# Patient Record
Sex: FEMALE | Race: OTHER | HISPANIC OR LATINO | Employment: UNEMPLOYED | URBAN - METROPOLITAN AREA
[De-identification: names, ages, dates, MRNs, and addresses within clinical notes are randomized per-mention and may not be internally consistent; named-entity substitution may affect disease eponyms.]

---

## 2022-09-23 ENCOUNTER — HOSPITAL ENCOUNTER (EMERGENCY)
Facility: HOSPITAL | Age: 62
Discharge: HOME | End: 2022-09-23
Attending: EMERGENCY MEDICINE

## 2022-09-23 VITALS
SYSTOLIC BLOOD PRESSURE: 192 MMHG | DIASTOLIC BLOOD PRESSURE: 89 MMHG | HEIGHT: 59 IN | HEART RATE: 74 BPM | WEIGHT: 113 LBS | RESPIRATION RATE: 16 BRPM | OXYGEN SATURATION: 96 % | BODY MASS INDEX: 22.78 KG/M2 | TEMPERATURE: 98 F

## 2022-09-23 DIAGNOSIS — H10.9 CONJUNCTIVITIS OF LEFT EYE, UNSPECIFIED CONJUNCTIVITIS TYPE: Primary | ICD-10-CM

## 2022-09-23 PROCEDURE — 25000003 PHARM REV CODE 250: Performed by: NURSE PRACTITIONER

## 2022-09-23 PROCEDURE — 99283 EMERGENCY DEPT VISIT LOW MDM: CPT

## 2022-09-23 RX ORDER — ERYTHROMYCIN 5 MG/G
OINTMENT OPHTHALMIC
Status: DISCONTINUED | OUTPATIENT
Start: 2022-09-24 | End: 2022-09-24 | Stop reason: HOSPADM

## 2022-09-23 RX ORDER — ERYTHROMYCIN 5 MG/G
OINTMENT OPHTHALMIC
Qty: 1 EACH | Refills: 0 | Status: SHIPPED | OUTPATIENT
Start: 2022-09-23

## 2022-09-23 RX ADMIN — FLUORESCEIN SODIUM AND BENOXINATE HYDROCHLORIDE 1 DROP: 2.5; 4 SOLUTION OPHTHALMIC at 09:09

## 2022-09-24 NOTE — FIRST PROVIDER EVALUATION
Medical screening examination initiated.  I have conducted a focused provider triage encounter, findings are as follows:    Brief history of present illness:  Patient complains of left eye pain was seen at urgent care and started eyedrops but states the pain is not better    There were no vitals filed for this visit.    Pertinent physical exam:  Left lower sclera chemosis    Brief workup plan:  eye evaluation    Preliminary workup initiated; this workup will be continued and followed by the physician or advanced practice provider that is assigned to the patient when roomed.

## 2022-09-24 NOTE — ED PROVIDER NOTES
"Encounter Date: 9/23/2022       History     Chief Complaint   Patient presents with    Eye Problem     Pt c/o left eye pain and drainage that began yesterday.  She went to a clinic today and got eye drops but continues to have problems opening it, and it still hurts.  She states that she can see "a little".     Patient presents to ER for left eye pain, onset yesterday.  Associated symptoms include eye drainage.  Patient states she was evaluated at a local urgent care today and prescribed by drops but states the pain has continued.  She denies fever, chills, generalized body aches, loss of vision.    The history is provided by the patient.   Review of patient's allergies indicates:  No Known Allergies  No past medical history on file.  No past surgical history on file.  No family history on file.     Review of Systems   Constitutional:  Negative for chills, fatigue and fever.   HENT:  Negative for ear pain, rhinorrhea, sinus pain and sore throat.    Eyes:  Positive for pain and discharge.   Respiratory:  Negative for cough and shortness of breath.    Cardiovascular:  Negative for chest pain and palpitations.   Gastrointestinal:  Negative for abdominal pain, nausea and vomiting.   Genitourinary:  Negative for dysuria.   Musculoskeletal:  Negative for back pain and myalgias.   Skin:  Negative for rash.   Neurological:  Negative for weakness and headaches.   All other systems reviewed and are negative.    Physical Exam     Initial Vitals [09/23/22 2142]   BP Pulse Resp Temp SpO2   (!) 192/89 74 16 97.9 °F (36.6 °C) 96 %      MAP       --         Physical Exam    Nursing note and vitals reviewed.  Constitutional: She appears well-developed and well-nourished. No distress.   HENT:   Head: Normocephalic and atraumatic.   Eyes: EOM are normal. Pupils are equal, round, and reactive to light. Right eye exhibits no discharge and no exudate. No foreign body present in the right eye. Left eye exhibits no discharge and no " exudate. No foreign body present in the left eye. Right conjunctiva is not injected. Right conjunctiva has no hemorrhage. Left conjunctiva is injected.   +conjunctival erythema noted to left eye.  Wood's lamp examination reveals no fluorescein uptake.  No foreign body is noted.  No corneal abrasion is noted.   Neck: Neck supple.   Normal range of motion.  Cardiovascular:  Normal rate, regular rhythm and intact distal pulses.           Pulmonary/Chest: Breath sounds normal. No respiratory distress.   Musculoskeletal:         General: Normal range of motion.      Cervical back: Normal range of motion and neck supple.     Neurological: She is alert and oriented to person, place, and time. She has normal strength. GCS score is 15. GCS eye subscore is 4. GCS verbal subscore is 5. GCS motor subscore is 6.   Skin: Skin is warm and dry. Capillary refill takes less than 2 seconds.       ED Course   Procedures  Labs Reviewed - No data to display       Imaging Results    None          Medications   erythromycin 5 mg/gram (0.5 %) ophthalmic ointment (has no administration in time range)   fluorescein-benoxinate 0.25-0.4% ophthalmic solution 1 drop (1 drop Left Eye Given 9/23/22 5539)                  I discussed with patient and family/caretaker that evaluation in the ED does not suggest any emergent or life threatening medical conditions requiring immediate intervention beyond what was provided in the ED, and I believe patient is safe for discharge. Regardless, an unremarkable evaluation in the ED does not preclude the development or presence of a serious of life threatening condition. As such, patient was instructed to return immediately for any worsening or change in current symptoms.              Clinical Impression:   Final diagnoses:  [H10.9] Conjunctivitis of left eye, unspecified conjunctivitis type (Primary)      ED Disposition Condition    Discharge Stable          ED Prescriptions       Medication Sig Dispense Start  Date End Date Auth. Provider    erythromycin (ROMYCIN) ophthalmic ointment Place a 1/2 inch ribbon of ointment into the lower eyelid 4 times daily for 7 days. 1 each 9/23/2022 -- Rishi Crespo NP          Follow-up Information       Follow up With Specialties Details Why Contact Info Additional Information    [facility] Ophthalmology [facility] Ophthalmology In 1 day  [address] [address] [phone] Please park on the [address] side and use the Clnic entrance. Check in on the 3rd floor or use any kiosk for self check-in.    [facility]  In 1 day  [address] [address] [phone]       [facility] Emergency Dept. Emergency Medicine  As needed, If symptoms worsen [address]  [facility] [address] [phone]              Rishi Crespo NP  09/24/22 0223

## 2022-10-01 ENCOUNTER — HOSPITAL ENCOUNTER (EMERGENCY)
Facility: HOSPITAL | Age: 62
Discharge: ANOTHER HOSPITAL | End: 2022-10-01
Attending: EMERGENCY MEDICINE

## 2022-10-01 VITALS
SYSTOLIC BLOOD PRESSURE: 140 MMHG | TEMPERATURE: 98 F | OXYGEN SATURATION: 96 % | RESPIRATION RATE: 16 BRPM | DIASTOLIC BLOOD PRESSURE: 65 MMHG | HEART RATE: 86 BPM | WEIGHT: 112 LBS | BODY MASS INDEX: 22.62 KG/M2

## 2022-10-01 DIAGNOSIS — H57.12 ACUTE LEFT EYE PAIN: Primary | ICD-10-CM

## 2022-10-01 DIAGNOSIS — R07.9 CHEST PAIN: ICD-10-CM

## 2022-10-01 DIAGNOSIS — I10 HYPERTENSION, UNSPECIFIED TYPE: ICD-10-CM

## 2022-10-01 DIAGNOSIS — H54.62 VISION LOSS OF LEFT EYE: ICD-10-CM

## 2022-10-01 LAB
ALBUMIN SERPL BCP-MCNC: 3.1 G/DL (ref 3.5–5.2)
ALP SERPL-CCNC: 105 U/L (ref 55–135)
ALT SERPL W/O P-5'-P-CCNC: 13 U/L (ref 10–44)
ANION GAP SERPL CALC-SCNC: 11 MMOL/L (ref 8–16)
AST SERPL-CCNC: 22 U/L (ref 10–40)
BASOPHILS # BLD AUTO: 0.06 K/UL (ref 0–0.2)
BASOPHILS NFR BLD: 1 % (ref 0–1.9)
BILIRUB SERPL-MCNC: 0.5 MG/DL (ref 0.1–1)
BNP SERPL-MCNC: 325 PG/ML (ref 0–99)
BUN SERPL-MCNC: 42 MG/DL (ref 8–23)
CALCIUM SERPL-MCNC: 8.8 MG/DL (ref 8.7–10.5)
CHLORIDE SERPL-SCNC: 97 MMOL/L (ref 95–110)
CO2 SERPL-SCNC: 24 MMOL/L (ref 23–29)
CREAT SERPL-MCNC: 2.7 MG/DL (ref 0.5–1.4)
DIFFERENTIAL METHOD: ABNORMAL
EOSINOPHIL # BLD AUTO: 0.3 K/UL (ref 0–0.5)
EOSINOPHIL NFR BLD: 4.4 % (ref 0–8)
ERYTHROCYTE [DISTWIDTH] IN BLOOD BY AUTOMATED COUNT: 13.2 % (ref 11.5–14.5)
EST. GFR  (NO RACE VARIABLE): 19 ML/MIN/1.73 M^2
GLUCOSE SERPL-MCNC: 193 MG/DL (ref 70–110)
HCT VFR BLD AUTO: 27.6 % (ref 37–48.5)
HGB BLD-MCNC: 9.2 G/DL (ref 12–16)
IMM GRANULOCYTES # BLD AUTO: 0.02 K/UL (ref 0–0.04)
IMM GRANULOCYTES NFR BLD AUTO: 0.3 % (ref 0–0.5)
LYMPHOCYTES # BLD AUTO: 1.3 K/UL (ref 1–4.8)
LYMPHOCYTES NFR BLD: 21.7 % (ref 18–48)
MCH RBC QN AUTO: 27.8 PG (ref 27–31)
MCHC RBC AUTO-ENTMCNC: 33.3 G/DL (ref 32–36)
MCV RBC AUTO: 83 FL (ref 82–98)
MONOCYTES # BLD AUTO: 0.4 K/UL (ref 0.3–1)
MONOCYTES NFR BLD: 5.9 % (ref 4–15)
NEUTROPHILS # BLD AUTO: 4 K/UL (ref 1.8–7.7)
NEUTROPHILS NFR BLD: 66.7 % (ref 38–73)
NRBC BLD-RTO: 0 /100 WBC
PLATELET # BLD AUTO: 269 K/UL (ref 150–450)
PMV BLD AUTO: 9.9 FL (ref 9.2–12.9)
POTASSIUM SERPL-SCNC: 4.7 MMOL/L (ref 3.5–5.1)
PROT SERPL-MCNC: 6.9 G/DL (ref 6–8.4)
RBC # BLD AUTO: 3.31 M/UL (ref 4–5.4)
SODIUM SERPL-SCNC: 132 MMOL/L (ref 136–145)
TROPONIN I SERPL DL<=0.01 NG/ML-MCNC: 0.01 NG/ML (ref 0–0.03)
WBC # BLD AUTO: 5.95 K/UL (ref 3.9–12.7)

## 2022-10-01 PROCEDURE — 96375 TX/PRO/DX INJ NEW DRUG ADDON: CPT

## 2022-10-01 PROCEDURE — 96365 THER/PROPH/DIAG IV INF INIT: CPT

## 2022-10-01 PROCEDURE — 25000003 PHARM REV CODE 250: Performed by: EMERGENCY MEDICINE

## 2022-10-01 PROCEDURE — 96376 TX/PRO/DX INJ SAME DRUG ADON: CPT

## 2022-10-01 PROCEDURE — 84484 ASSAY OF TROPONIN QUANT: CPT | Performed by: NURSE PRACTITIONER

## 2022-10-01 PROCEDURE — 99285 EMERGENCY DEPT VISIT HI MDM: CPT | Mod: 25

## 2022-10-01 PROCEDURE — 63600175 PHARM REV CODE 636 W HCPCS: Performed by: EMERGENCY MEDICINE

## 2022-10-01 PROCEDURE — 85025 COMPLETE CBC W/AUTO DIFF WBC: CPT | Performed by: NURSE PRACTITIONER

## 2022-10-01 PROCEDURE — 25000003 PHARM REV CODE 250: Performed by: NURSE PRACTITIONER

## 2022-10-01 PROCEDURE — 63600175 PHARM REV CODE 636 W HCPCS: Performed by: NURSE PRACTITIONER

## 2022-10-01 PROCEDURE — 83880 ASSAY OF NATRIURETIC PEPTIDE: CPT | Performed by: NURSE PRACTITIONER

## 2022-10-01 PROCEDURE — 80053 COMPREHEN METABOLIC PANEL: CPT | Performed by: NURSE PRACTITIONER

## 2022-10-01 RX ORDER — MANNITOL 250 MG/ML
25 INJECTION, SOLUTION INTRAVENOUS
Status: DISCONTINUED | OUTPATIENT
Start: 2022-10-01 | End: 2022-10-01 | Stop reason: HOSPADM

## 2022-10-01 RX ORDER — MORPHINE SULFATE 4 MG/ML
2 INJECTION, SOLUTION INTRAMUSCULAR; INTRAVENOUS
Status: COMPLETED | OUTPATIENT
Start: 2022-10-01 | End: 2022-10-01

## 2022-10-01 RX ORDER — ONDANSETRON 2 MG/ML
4 INJECTION INTRAMUSCULAR; INTRAVENOUS ONCE
Status: COMPLETED | OUTPATIENT
Start: 2022-10-01 | End: 2022-10-01

## 2022-10-01 RX ORDER — PILOCARPINE HYDROCHLORIDE 20 MG/ML
1 SOLUTION/ DROPS OPHTHALMIC 4 TIMES DAILY
Status: DISCONTINUED | OUTPATIENT
Start: 2022-10-01 | End: 2022-10-01 | Stop reason: HOSPADM

## 2022-10-01 RX ORDER — TIMOLOL MALEATE 5 MG/ML
1 SOLUTION/ DROPS OPHTHALMIC 2 TIMES DAILY
Status: DISCONTINUED | OUTPATIENT
Start: 2022-10-01 | End: 2022-10-01 | Stop reason: HOSPADM

## 2022-10-01 RX ORDER — DORZOLAMIDE HCL 20 MG/ML
1 SOLUTION/ DROPS OPHTHALMIC ONCE
Status: DISCONTINUED | OUTPATIENT
Start: 2022-10-01 | End: 2022-10-01

## 2022-10-01 RX ORDER — BRIMONIDINE TARTRATE 1.5 MG/ML
1 SOLUTION/ DROPS OPHTHALMIC EVERY 8 HOURS
Status: DISCONTINUED | OUTPATIENT
Start: 2022-10-01 | End: 2022-10-01 | Stop reason: HOSPADM

## 2022-10-01 RX ORDER — HYDRALAZINE HYDROCHLORIDE 20 MG/ML
10 INJECTION INTRAMUSCULAR; INTRAVENOUS
Status: COMPLETED | OUTPATIENT
Start: 2022-10-01 | End: 2022-10-01

## 2022-10-01 RX ORDER — ONDANSETRON 2 MG/ML
4 INJECTION INTRAMUSCULAR; INTRAVENOUS
Status: COMPLETED | OUTPATIENT
Start: 2022-10-01 | End: 2022-10-01

## 2022-10-01 RX ORDER — ACETAZOLAMIDE 250 MG/1
500 TABLET ORAL ONCE
Status: DISCONTINUED | OUTPATIENT
Start: 2022-10-01 | End: 2022-10-01

## 2022-10-01 RX ORDER — SODIUM CHLORIDE 9 MG/ML
1000 INJECTION, SOLUTION INTRAVENOUS CONTINUOUS
Status: DISCONTINUED | OUTPATIENT
Start: 2022-10-01 | End: 2022-10-01 | Stop reason: HOSPADM

## 2022-10-01 RX ADMIN — ONDANSETRON 4 MG: 2 INJECTION INTRAMUSCULAR; INTRAVENOUS at 03:10

## 2022-10-01 RX ADMIN — HYDRALAZINE HYDROCHLORIDE 10 MG: 20 INJECTION, SOLUTION INTRAMUSCULAR; INTRAVENOUS at 05:10

## 2022-10-01 RX ADMIN — SODIUM CHLORIDE 1000 ML: 0.9 INJECTION, SOLUTION INTRAVENOUS at 06:10

## 2022-10-01 RX ADMIN — MORPHINE SULFATE 2 MG: 4 INJECTION INTRAVENOUS at 06:10

## 2022-10-01 RX ADMIN — FLUORESCEIN SODIUM AND BENOXINATE HYDROCHLORIDE 1 DROP: 2.5; 4 SOLUTION OPHTHALMIC at 06:10

## 2022-10-01 RX ADMIN — PILOCARPINE HYDROCHLORIDE 1 DROP: 20 SOLUTION/ DROPS OPHTHALMIC at 06:10

## 2022-10-01 RX ADMIN — ACETAZOLAMIDE 500 MG: 500 INJECTION, POWDER, LYOPHILIZED, FOR SOLUTION INTRAVENOUS at 06:10

## 2022-10-01 RX ADMIN — MORPHINE SULFATE 2 MG: 4 INJECTION INTRAVENOUS at 03:10

## 2022-10-01 RX ADMIN — TIMOLOL MALEATE 1 DROP: 5 SOLUTION/ DROPS OPHTHALMIC at 06:10

## 2022-10-01 RX ADMIN — ONDANSETRON 4 MG: 2 INJECTION INTRAMUSCULAR; INTRAVENOUS at 06:10

## 2022-10-01 RX ADMIN — BRIMONIDINE TARTRATE 1 DROP: 1.5 SOLUTION OPHTHALMIC at 06:10

## 2022-10-01 NOTE — FIRST PROVIDER EVALUATION
Medical screening examination initiated.  I have conducted a focused provider triage encounter, findings are as follows:    Brief history of present illness:  left sided eye pain without relief from meds. Also reports chest pain and palpitations     Vitals:    10/01/22 1305   BP: (!) 184/82   BP Location: Left arm   Patient Position: Sitting   Pulse: 77   Resp: 20   Temp: 97.6 °F (36.4 °C)   TempSrc: Oral   SpO2: 99%       Pertinent physical exam:  nad    Brief workup plan:  labs, meds, imaging     Preliminary workup initiated; this workup will be continued and followed by the physician or advanced practice provider that is assigned to the patient when roomed.

## 2022-10-01 NOTE — ED PROVIDER NOTES
SCRIBE #1 NOTE: I, Idalia Clinton, am scribing for, and in the presence of, Delmi Titus DO. I have scribed the entire note.       History     Chief Complaint   Patient presents with    Eye Pain     L eye pain. Seen here for same issue on 9/23. Dx w/ pink eye, has been using abx ointment w/ no relief.     Review of patient's allergies indicates:  No Known Allergies      History of Present Illness     HPI    10/1/2022, 5:36 PM  History obtained from the patient through [employer] [facility]      History of Present Illness: Roxana Poole is a 62 y.o. female patient who presents to the Emergency Department for evaluation of left eye pain which onset 2 months PTA and worsened a few hours PTA. Symptoms are constant and moderate in severity.  Prior Tx includes 8 days PTA pt was evaluated for eye pain, and she was diagnosed with an eye infection and given antibiotic ointment to put on 4 times a day. Pt notes she has had pain in her left eye for 2 months. Pt states that slowly over time, since two months ago, her vision has worsened; 3 weeks ago she could see a little, and 2 weeks ago she could not see anything. Pt reports she still cannot see anything out of the left eye except for light. Pt notes that she cannot open the eye and light bothers it. No mitigating or exacerbating factors reported. Associated sxs include loss of vision in left eye, watering of left eye, and rhinorrhea of left nostril. Patient denies any n/v, fever, one-sided numbness, eye itching, chills, and all other sxs at this time. No further complaints or concerns at this time. No Optometry on call.       From old chart:  Noted on 8/10/2022  Vision decreased 08/10/2022   Last Assessment & Plan:     Formatting of this note is different from the original.  History & Physical   Patient has nearly full vision loss in left eye and blurred vision in her right eye, which she has noticed in the past 2 weeks. There is concern that this may be secondary to  diabetic retinopathy or hypertensive retinopathy.  - Continue to monitor, consider ophthalmology evaluation outpatient.  Discharge Summary        Arrival mode: Personal vehicle    PCP: Primary Doctor No        Past Medical History:  Past Medical History:   Diagnosis Date    Anemia of chronic renal failure, stage 4 (severe)     Combined congestive systolic and diastolic heart failure     Decreased peripheral vision, left     Hyponatremia     Neurogenic bladder        Past Surgical History:  Past Surgical History:   Procedure Laterality Date    COLONOSCOPY           Family History:  No family history on file.    Social History:  Social History     Tobacco Use    Smoking status: Never    Smokeless tobacco: Not on file   Substance and Sexual Activity    Alcohol use: Never    Drug use: Never    Sexual activity: Not on file        Review of Systems     Review of Systems   Constitutional:  Negative for chills and fever.   HENT:  Positive for rhinorrhea (left nostril). Negative for sore throat.    Eyes:  Positive for pain (left). Negative for itching.        (+) left eye watering  (+) loss of vision in left eye   Respiratory:  Positive for shortness of breath.    Cardiovascular:  Positive for chest pain.   Gastrointestinal:  Negative for nausea and vomiting.   Genitourinary:  Negative for dysuria.   Musculoskeletal:  Negative for back pain.   Skin:  Negative for rash.   Neurological:  Negative for weakness and numbness (one-sided).   Hematological:  Does not bruise/bleed easily.   All other systems reviewed and are negative.     Physical Exam     Initial Vitals [10/01/22 1305]   BP Pulse Resp Temp SpO2   (!) 184/82 77 20 97.6 °F (36.4 °C) 99 %      MAP       --          Physical Exam  Nursing Notes and Vital Signs Reviewed.  Constitutional: Patient is in no acute distress. Well-developed and well-nourished.  Head: Atraumatic. Normocephalic.  Eyes: PERRL. EOM intact. Conjunctivae are not pale. No scleral icterus.   Left eye  cloudy. Left eye dilated. Pupils mid point. Left eye conjunctivae are red.  Left pupil is minimally reactive.  No relief with application of topical anesthetic.  Patient can only see light cautioned unable to count fingers with left eye.  Eyelids everted no foreign body.  Wood's lamp: No fluorescein uptake.  Negative Alton sign.  Handheld tonometer:  Left eye pressure, 22, 55, 58  ENT: Mucous membranes are moist. Oropharynx is clear and symmetric.    Neck: Supple. Full ROM. No lymphadenopathy.  Cardiovascular: Regular rate. Regular rhythm. No murmurs, rubs, or gallops. Distal pulses are 2+ and symmetric.  Pulmonary/Chest: No respiratory distress. Clear to auscultation bilaterally. No wheezing or rales.  Abdominal: Soft and non-distended.  There is no tenderness.  No rebound, guarding, or rigidity. Good bowel sounds.  Genitourinary: No CVA tenderness  Musculoskeletal: Moves all extremities. No obvious deformities. No edema. No calf tenderness.  Skin: Warm and dry.  Neurological:  Alert, awake, and appropriate.  Normal speech.  No acute focal neurological deficits are appreciated.  GCS is 15.  Cranial nerves 2-12 intact.  No focal deficits  Psychiatric: Normal affect. Good eye contact. Appropriate in content.     ED Course   Procedures  ED Vital Signs:  Vitals:    10/01/22 1305 10/01/22 1513 10/01/22 1516 10/01/22 1537   BP: (!) 184/82   (!) 199/88   Pulse: 77   72   Resp: 20 20  18   Temp: 97.6 °F (36.4 °C)      TempSrc: Oral      SpO2: 99%   97%   Weight:   50.8 kg (112 lb)     10/01/22 1735 10/01/22 1746 10/01/22 1802 10/01/22 1845   BP: (!) 216/102 (!) 192/83 (!) 140/65    Pulse:  83 86    Resp:  14 15 16   Temp:       TempSrc:       SpO2:  98% 96%    Weight:           Abnormal Lab Results:  Labs Reviewed   CBC W/ AUTO DIFFERENTIAL - Abnormal; Notable for the following components:       Result Value    RBC 3.31 (*)     Hemoglobin 9.2 (*)     Hematocrit 27.6 (*)     All other components within normal limits    COMPREHENSIVE METABOLIC PANEL - Abnormal; Notable for the following components:    Sodium 132 (*)     Glucose 193 (*)     BUN 42 (*)     Creatinine 2.7 (*)     Albumin 3.1 (*)     eGFR 19 (*)     All other components within normal limits   B-TYPE NATRIURETIC PEPTIDE - Abnormal; Notable for the following components:     (*)     All other components within normal limits   TROPONIN I        All Lab Results:  Results for orders placed or performed during the hospital encounter of 10/01/22   CBC auto differential   Result Value Ref Range    WBC 5.95 3.90 - 12.70 K/uL    RBC 3.31 (L) 4.00 - 5.40 M/uL    Hemoglobin 9.2 (L) 12.0 - 16.0 g/dL    Hematocrit 27.6 (L) 37.0 - 48.5 %    MCV 83 82 - 98 fL    MCH 27.8 27.0 - 31.0 pg    MCHC 33.3 32.0 - 36.0 g/dL    RDW 13.2 11.5 - 14.5 %    Platelets 269 150 - 450 K/uL    MPV 9.9 9.2 - 12.9 fL    Immature Granulocytes 0.3 0.0 - 0.5 %    Gran # (ANC) 4.0 1.8 - 7.7 K/uL    Immature Grans (Abs) 0.02 0.00 - 0.04 K/uL    Lymph # 1.3 1.0 - 4.8 K/uL    Mono # 0.4 0.3 - 1.0 K/uL    Eos # 0.3 0.0 - 0.5 K/uL    Baso # 0.06 0.00 - 0.20 K/uL    nRBC 0 0 /100 WBC    Gran % 66.7 38.0 - 73.0 %    Lymph % 21.7 18.0 - 48.0 %    Mono % 5.9 4.0 - 15.0 %    Eosinophil % 4.4 0.0 - 8.0 %    Basophil % 1.0 0.0 - 1.9 %    Differential Method Automated    Comprehensive metabolic panel   Result Value Ref Range    Sodium 132 (L) 136 - 145 mmol/L    Potassium 4.7 3.5 - 5.1 mmol/L    Chloride 97 95 - 110 mmol/L    CO2 24 23 - 29 mmol/L    Glucose 193 (H) 70 - 110 mg/dL    BUN 42 (H) 8 - 23 mg/dL    Creatinine 2.7 (H) 0.5 - 1.4 mg/dL    Calcium 8.8 8.7 - 10.5 mg/dL    Total Protein 6.9 6.0 - 8.4 g/dL    Albumin 3.1 (L) 3.5 - 5.2 g/dL    Total Bilirubin 0.5 0.1 - 1.0 mg/dL    Alkaline Phosphatase 105 55 - 135 U/L    AST 22 10 - 40 U/L    ALT 13 10 - 44 U/L    Anion Gap 11 8 - 16 mmol/L    eGFR 19 (A) >60 mL/min/1.73 m^2   Troponin I #1   Result Value Ref Range    Troponin I 0.014 0.000 - 0.026 ng/mL    BNP   Result Value Ref Range     (H) 0 - 99 pg/mL         Imaging Results:  Imaging Results              X-Ray Chest AP Portable (Final result)  Result time 10/01/22 16:32:46      Final result by Rae Hathaway MD (10/01/22 16:32:46)                   Impression:      Mild interstitial perihilar prominence.  No focal consolidation pleural effusion pneumothorax.  Element of pulmonary edema not excluded.      Electronically signed by: Rivas Mcbride  Date:    10/01/2022  Time:    16:32               Narrative:    EXAMINATION:  XR CHEST AP PORTABLE    CLINICAL HISTORY:  Chest pain, unspecified    TECHNIQUE:  Single frontal view of the chest was performed.    COMPARISON:  None    FINDINGS:  Minimal interstitial prominence.  Question element of pulmonary edema.The lungs are clear, with normal appearance of pulmonary vasculature and no pleural effusion or pneumothorax.    The cardiac silhouette is prominent.  The hilar and mediastinal contours are unremarkable.    Bones are intact.                                       The EKG was ordered, reviewed, and independently interpreted by the ED provider.  EKG: Rate is 73 beats per minute.  Sinus rhythm.  Normal axis.  No ST segment elevation.  Nonspecific changes           The Emergency Provider reviewed the vital signs and test results, which are outlined above.     ED Discussion       ED Course as of 10/01/22 1905   Sat Oct 01, 2022   1618 Creatinine Level 0.57 - 1.25 mg/dL 2.51 High     Blood Urea Nitrogen Level 5 - 25 mg/dL 54 High     Sodium Level 136 - 145 mmol/L 126 Low     Potassium Level 3.5 - 5.1 mmol/L 4.5    Chloride Level 100 - 109 mmol/L 86 Low     CO2 Level 22 - 33 mmol/L 30    Glucose Level 70 - 100 mg/dL 107 High     Calcium Level 8.8 - 10.6 mg/dL 8.2 Low     Protein Total 6.0 - 8.3 g/dL 5.4 Low     Albumin Level 3.5 - 5.0 g/dl 2.7 Low     Bilirubin Total 0.2 - 1.2 mg/dL 0.4    Alkaline Phosphatase Level 40 - 150 U/L 114    SGOT (AST) 10 - 58 U/L 15     SGPT (ALT) 5 - 50 U/L 9    Anion Gap 8 - 16 mmol/L 10    EGFR M/min/1.73mSq 21      Creatinine stable   [LB]      ED Course User Index  [LB] Delmi Titus DO       NOTE:   [facility]  DOES NOT HAVE OPHTHALMOLOGY SERVICES.  Patient requesting closest facility.      6:29 PM      All historical, clinical, radiographic, and laboratory findings were reviewed with the patient/family in detail.  I discussed the indications and treatment need: (Ophthalmology) .    Patient/Family requests transfer to: [facility]    Patient/Family understands that [facility] does NOT provide (Ophthalmology) services.     Patient/family verbalized understanding.   All remaining questions and concerns were addressed at that time and the patient/family agrees to proceed accordingly.  Similarly all pertinent details of the encounter were discussed with  Brandi Tang at  [facility]. Dr. Davis at [facility]  agrees to accept the patient in transfer based on the needs/patient preferences outlined above.  Patient will be transferred by [facility] , secondary to a need for ongoing Cardiac Monitoring, Pain Control, and IV drip  en route.  Risks: of transfer:    loss of vitals signs, permanent neurologic damage, MVC, loss of vision resulting in death, blindness, or loss of neurologic function.  Benefits of transfer: Ophthalmology.  Patient and family agree and verbalize understanding.     Idalia Clinton FACEP            ED Medication(s):  Medications   timolol maleate 0.5% ophthalmic solution 1 drop (1 drop Left Eye Given 10/1/22 1819)   pilocarpine HCL 2% ophthalmic solution 1 drop (1 drop Left Eye Given 10/1/22 1820)   brimonidine 0.15 % OPTH DROP ophthalmic solution 1 drop (1 drop Left Eye Given 10/1/22 1819)   mannitol 25% injection 25 g (has no administration in time range)   0.9%  NaCl infusion (1,000 mLs Intravenous New Bag 10/1/22 1843)   fluorescein-benoxinate  0.25-0.4% ophthalmic solution 1 drop (1 drop Left Eye Given 10/1/22 1828)   morphine injection 2 mg (2 mg Intravenous Given 10/1/22 1513)   ondansetron injection 4 mg (4 mg Intravenous Given 10/1/22 1517)   hydrALAZINE injection 10 mg (10 mg Intravenous Given 10/1/22 1735)   acetaZOLAMIDE (DIAMOX) 500 mg in dextrose 5 % 50 mL (0 mg Intravenous Stopped 10/1/22 1900)   morphine injection 2 mg (2 mg Intravenous Given 10/1/22 1845)   ondansetron injection 4 mg (4 mg Intravenous Given 10/1/22 1845)       New Prescriptions    No medications on file               Scribe Attestation:   Scribe #1: I performed the above scribed service and the documentation accurately describes the services I performed. I attest to the accuracy of the note.     Attending:   Physician Attestation Statement for Scribe #1: I, Delmi Titus DO, personally performed the services described in this documentation, as scribed by Idalia Clinton, in my presence, and it is both accurate and complete.           Clinical Impression       ICD-10-CM ICD-9-CM   1. Acute left eye pain  H57.12 379.91   2. Chest pain  R07.9 786.50   3. Vision loss of left eye (gradual in onset for last 2 months, 2 weeks ago worsening vision)  H54.62 369.8   4. Hypertension, unspecified type  I10 401.9       Disposition:   Disposition: Transferred  Condition: Fair       Delmi Titus DO  10/02/22 0606

## 2022-11-02 DIAGNOSIS — H40.9 GLAUCOMA, UNSPECIFIED GLAUCOMA TYPE, UNSPECIFIED LATERALITY: Primary | ICD-10-CM

## 2022-11-10 ENCOUNTER — OFFICE VISIT (OUTPATIENT)
Dept: OPHTHALMOLOGY | Facility: CLINIC | Age: 62
End: 2022-11-10

## 2022-11-10 VITALS — BODY MASS INDEX: 22.58 KG/M2 | WEIGHT: 112 LBS | HEIGHT: 59 IN

## 2022-11-10 DIAGNOSIS — E11.3593 PROLIFERATIVE DIABETIC RETINOPATHY OF BOTH EYES ASSOCIATED WITH TYPE 2 DIABETES MELLITUS, UNSPECIFIED PROLIFERATIVE RETINOPATHY TYPE: ICD-10-CM

## 2022-11-10 DIAGNOSIS — H40.52X4 NEOVASCULAR GLAUCOMA OF LEFT EYE, INDETERMINATE STAGE: Primary | ICD-10-CM

## 2022-11-10 PROCEDURE — 99215 OFFICE O/P EST HI 40 MIN: CPT | Mod: PBBFAC,PO

## 2022-11-10 RX ORDER — PILOCARPINE HYDROCHLORIDE 10 MG/ML
1 SOLUTION/ DROPS OPHTHALMIC
COMMUNITY
Start: 2022-10-01 | End: 2023-03-30

## 2022-11-10 RX ORDER — FUROSEMIDE 80 MG/1
1 TABLET ORAL DAILY
COMMUNITY
Start: 2022-09-19 | End: 2023-09-19

## 2022-11-10 RX ORDER — DORZOLAMIDE HCL 20 MG/ML
1 SOLUTION/ DROPS OPHTHALMIC 3 TIMES DAILY
COMMUNITY
Start: 2022-10-03

## 2022-11-10 RX ORDER — PREDNISOLONE ACETATE 10 MG/ML
SUSPENSION/ DROPS OPHTHALMIC
COMMUNITY

## 2022-11-10 RX ORDER — LATANOPROST 50 UG/ML
1 SOLUTION/ DROPS OPHTHALMIC NIGHTLY
COMMUNITY
Start: 2022-10-03

## 2022-11-10 RX ORDER — HYDROCHLOROTHIAZIDE 12.5 MG/1
CAPSULE ORAL
COMMUNITY

## 2022-11-10 RX ORDER — ONDANSETRON 4 MG/1
TABLET, ORALLY DISINTEGRATING ORAL
COMMUNITY

## 2022-11-10 RX ORDER — TAMSULOSIN HYDROCHLORIDE 0.4 MG/1
1 CAPSULE ORAL NIGHTLY
COMMUNITY
Start: 2022-10-14

## 2022-11-10 RX ORDER — HYDRALAZINE HYDROCHLORIDE 25 MG/1
1 TABLET, FILM COATED ORAL 3 TIMES DAILY
COMMUNITY
Start: 2022-09-19 | End: 2023-09-19

## 2022-11-10 RX ORDER — TIMOLOL MALEATE 5 MG/ML
SOLUTION/ DROPS OPHTHALMIC
COMMUNITY
Start: 2022-10-03

## 2022-11-10 RX ORDER — BETHANECHOL CHLORIDE 50 MG/1
50 TABLET ORAL
COMMUNITY
Start: 2022-10-14 | End: 2023-04-12

## 2022-11-10 RX ORDER — PIOGLITAZONEHYDROCHLORIDE 15 MG/1
TABLET ORAL
COMMUNITY

## 2022-11-10 RX ORDER — OMEPRAZOLE 20 MG/1
20 CAPSULE, DELAYED RELEASE ORAL DAILY
COMMUNITY
Start: 2022-10-24

## 2022-11-10 RX ORDER — LOSARTAN POTASSIUM 100 MG/1
TABLET ORAL
COMMUNITY

## 2022-11-10 RX ORDER — ACETAZOLAMIDE 500 MG/1
500 CAPSULE, EXTENDED RELEASE ORAL 2 TIMES DAILY
COMMUNITY

## 2022-11-10 RX ORDER — METFORMIN HYDROCHLORIDE 1000 MG/1
1000 TABLET ORAL 2 TIMES DAILY
COMMUNITY
Start: 2022-07-01

## 2022-11-10 RX ORDER — ATORVASTATIN CALCIUM 40 MG/1
1 TABLET, FILM COATED ORAL NIGHTLY
COMMUNITY
Start: 2022-09-19

## 2022-11-10 RX ORDER — GLIPIZIDE 5 MG/1
TABLET ORAL
COMMUNITY

## 2022-11-10 RX ORDER — PROPARACAINE HYDROCHLORIDE 5 MG/ML
1 SOLUTION/ DROPS OPHTHALMIC
Status: CANCELLED | OUTPATIENT
Start: 2022-11-10

## 2022-11-10 RX ORDER — GABAPENTIN 100 MG/1
100 CAPSULE ORAL 3 TIMES DAILY
COMMUNITY
Start: 2022-10-03

## 2022-11-10 RX ORDER — CARVEDILOL 12.5 MG/1
12.5 TABLET ORAL 2 TIMES DAILY
COMMUNITY
Start: 2022-10-11

## 2022-11-10 RX ORDER — ISOSORBIDE DINITRATE 20 MG/1
20 TABLET ORAL
COMMUNITY
Start: 2022-09-19 | End: 2023-03-18

## 2022-11-10 RX ORDER — SODIUM CHLORIDE 0.9 % (FLUSH) 0.9 %
10 SYRINGE (ML) INJECTION
Status: SHIPPED | OUTPATIENT
Start: 2022-11-10

## 2022-11-10 NOTE — H&P
Pre-Operative History & Physical  Ophthalmology      SUBJECTIVE:     History of Present Illness:  Patient is a 62 y.o. 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    MEDICATIONS: (Not in a hospital admission)      ALLERGIES: Review of patient's allergies indicates:  No Known Allergies    PAST MEDICAL HISTORY:   Past Medical History:   Diagnosis Date    Anemia of chronic renal failure, stage 4 (severe)     Combined congestive systolic and diastolic heart failure     Decreased peripheral vision, left     Hyponatremia     Neurogenic bladder      PAST SURGICAL HISTORY:   Past Surgical History:   Procedure Laterality Date    COLONOSCOPY       PAST FAMILY HISTORY:   Family History   Problem Relation Age of Onset    Diabetes Sister      SOCIAL HISTORY:   Social History     Tobacco Use    Smoking status: Never     Passive exposure: Never   Substance Use Topics    Alcohol use: Never    Drug use: Never        MENTAL STATUS: Alert    REVIEW OF SYSTEMS: Negative    OBJECTIVE:     Vital Signs (Most Recent)       Physical Exam:  General: NAD  HEENT: AT/NC, left eye IOP 36  Lungs: Adequate respirations  Heart: + pulses  Abdomen: Soft    ASSESSMENT/PLAN:     Patient is a 62 y.o. 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    - Plan for tube shunt surgery (Ahmed valve) left eye (OS).   - Allergies reviewed: Review of patient's allergies indicates:  No Known Allergies  - Has not taken blood thinners (includes ASA, NSAIDS, BC Powder, Goody's Powder, Excedrine, Eliquis, Xarelto, Pradaxa, etc.) for over 5 days.   - Risks/benefits/alternatives of the procedure including, but not limited to scarring, bleeding, infection, loss or decreased vision, and/or need for possible repeat surgery discussed with the patient and family.  - Informed consent obtained prior to surgery and the patient/family voiced good understanding.    Lama Robin MD  [facility]        patient left area and returned to [facility] without undergoing recommended surgery

## 2022-11-10 NOTE — PROGRESS NOTES
Assessment /Plan     For exam results, see Encounter Report.    Neovascular glaucoma of left eye, indeterminate stage    Proliferative diabetic retinopathy of both eyes associated with type 2 diabetes mellitus, unspecified proliferative retinopathy type    Neovascular glaucoma, left eye  - Referred from  for surgery evaluation for elevated IOP (44) despite maximum therapy   - On timolol BID, brimonidine TID, dorzolamide TID, latanoprost QHS, and PF QID, in the left eye  - Started Diamox 500 mg PO BID 10/1/22, patient has CKD, referral to nephrology placed, but patient seems to be tolerating it well  - NVI and NVA regressed in both eyes, however, heavy PAS OS likely sealing off much of angle  - Plan for urgent tube shunt surgery with Ahmed valve today     Type II DM with PDR, both eyes  - Following with Dr. Broderick in   - Received intravitreal Avastin in the left eye around 3-4 weeks ago

## 2022-11-29 NOTE — PROGRESS NOTES
I have reviewed the notes, assessments, and/or procedures performed by resident and agree.  Patient instructed to go to [facility] for urgent valve placement.  Patient decided to return to [facility] against our advice